# Patient Record
Sex: MALE | Race: WHITE | NOT HISPANIC OR LATINO | Employment: FULL TIME | URBAN - NONMETROPOLITAN AREA
[De-identification: names, ages, dates, MRNs, and addresses within clinical notes are randomized per-mention and may not be internally consistent; named-entity substitution may affect disease eponyms.]

---

## 2021-03-30 ENCOUNTER — OUTSIDE FACILITY SERVICE (OUTPATIENT)
Dept: CARDIOLOGY | Facility: CLINIC | Age: 31
End: 2021-03-30

## 2021-03-30 PROCEDURE — 93350 STRESS TTE ONLY: CPT | Performed by: INTERNAL MEDICINE

## 2021-03-30 PROCEDURE — 93018 CV STRESS TEST I&R ONLY: CPT | Performed by: INTERNAL MEDICINE

## 2021-10-19 ENCOUNTER — CONSULT (OUTPATIENT)
Dept: ONCOLOGY | Facility: CLINIC | Age: 31
End: 2021-10-19

## 2021-10-19 ENCOUNTER — LAB (OUTPATIENT)
Dept: LAB | Facility: HOSPITAL | Age: 31
End: 2021-10-19

## 2021-10-19 VITALS
RESPIRATION RATE: 16 BRPM | OXYGEN SATURATION: 99 % | HEIGHT: 67 IN | WEIGHT: 189.5 LBS | SYSTOLIC BLOOD PRESSURE: 138 MMHG | BODY MASS INDEX: 29.74 KG/M2 | DIASTOLIC BLOOD PRESSURE: 80 MMHG | TEMPERATURE: 97.8 F | HEART RATE: 89 BPM

## 2021-10-19 DIAGNOSIS — D72.829 LEUKOCYTOSIS, UNSPECIFIED TYPE: Primary | ICD-10-CM

## 2021-10-19 LAB
ALBUMIN SERPL-MCNC: 4.6 G/DL (ref 3.5–5.2)
ALBUMIN/GLOB SERPL: 1.7 G/DL
ALP SERPL-CCNC: 72 U/L (ref 39–117)
ALT SERPL W P-5'-P-CCNC: 27 U/L (ref 1–41)
ANION GAP SERPL CALCULATED.3IONS-SCNC: 6 MMOL/L (ref 5–15)
AST SERPL-CCNC: 20 U/L (ref 1–40)
BASOPHILS # BLD AUTO: 0.05 10*3/MM3 (ref 0–0.2)
BASOPHILS NFR BLD AUTO: 0.5 % (ref 0–1.5)
BILIRUB SERPL-MCNC: 0.2 MG/DL (ref 0–1.2)
BUN SERPL-MCNC: 16 MG/DL (ref 6–20)
BUN/CREAT SERPL: 22.2 (ref 7–25)
CALCIUM SPEC-SCNC: 9.7 MG/DL (ref 8.6–10.5)
CHLORIDE SERPL-SCNC: 107 MMOL/L (ref 98–107)
CO2 SERPL-SCNC: 29 MMOL/L (ref 22–29)
CREAT SERPL-MCNC: 0.72 MG/DL (ref 0.76–1.27)
CYTOLOGIST CVX/VAG CYTO: NORMAL
DEPRECATED RDW RBC AUTO: 39.9 FL (ref 37–54)
EOSINOPHIL # BLD AUTO: 0.5 10*3/MM3 (ref 0–0.4)
EOSINOPHIL NFR BLD AUTO: 5.3 % (ref 0.3–6.2)
ERYTHROCYTE [DISTWIDTH] IN BLOOD BY AUTOMATED COUNT: 12.2 % (ref 12.3–15.4)
FERRITIN SERPL-MCNC: 65.53 NG/ML (ref 30–400)
FOLATE SERPL-MCNC: 12.5 NG/ML (ref 4.78–24.2)
GFR SERPL CREATININE-BSD FRML MDRD: 126 ML/MIN/1.73
GLOBULIN UR ELPH-MCNC: 2.7 GM/DL
GLUCOSE SERPL-MCNC: 95 MG/DL (ref 65–99)
HCT VFR BLD AUTO: 43.2 % (ref 37.5–51)
HCV AB SER DONR QL: NORMAL
HGB BLD-MCNC: 14.2 G/DL (ref 13–17.7)
IMM GRANULOCYTES # BLD AUTO: 0.03 10*3/MM3 (ref 0–0.05)
IMM GRANULOCYTES NFR BLD AUTO: 0.3 % (ref 0–0.5)
IRON 24H UR-MRATE: 61 MCG/DL (ref 59–158)
IRON SATN MFR SERPL: 15 % (ref 20–50)
LDH SERPL-CCNC: 170 U/L (ref 135–225)
LYMPHOCYTES # BLD AUTO: 2.81 10*3/MM3 (ref 0.7–3.1)
LYMPHOCYTES NFR BLD AUTO: 29.8 % (ref 19.6–45.3)
MCH RBC QN AUTO: 29.6 PG (ref 26.6–33)
MCHC RBC AUTO-ENTMCNC: 32.9 G/DL (ref 31.5–35.7)
MCV RBC AUTO: 90 FL (ref 79–97)
MONOCYTES # BLD AUTO: 0.98 10*3/MM3 (ref 0.1–0.9)
MONOCYTES NFR BLD AUTO: 10.4 % (ref 5–12)
NEUTROPHILS NFR BLD AUTO: 5.07 10*3/MM3 (ref 1.7–7)
NEUTROPHILS NFR BLD AUTO: 53.7 % (ref 42.7–76)
NRBC BLD AUTO-RTO: 0 /100 WBC (ref 0–0.2)
PATH INTERP BLD-IMP: NORMAL
PLATELET # BLD AUTO: 256 10*3/MM3 (ref 140–450)
PMV BLD AUTO: 9.9 FL (ref 6–12)
POTASSIUM SERPL-SCNC: 4.3 MMOL/L (ref 3.5–5.2)
PROT SERPL-MCNC: 7.3 G/DL (ref 6–8.5)
RBC # BLD AUTO: 4.8 10*6/MM3 (ref 4.14–5.8)
SODIUM SERPL-SCNC: 142 MMOL/L (ref 136–145)
TIBC SERPL-MCNC: 402 MCG/DL (ref 298–536)
TRANSFERRIN SERPL-MCNC: 270 MG/DL (ref 200–360)
VIT B12 BLD-MCNC: 713 PG/ML (ref 211–946)
WBC # BLD AUTO: 9.44 10*3/MM3 (ref 3.4–10.8)

## 2021-10-19 PROCEDURE — 83615 LACTATE (LD) (LDH) ENZYME: CPT | Performed by: INTERNAL MEDICINE

## 2021-10-19 PROCEDURE — 80053 COMPREHEN METABOLIC PANEL: CPT | Performed by: INTERNAL MEDICINE

## 2021-10-19 PROCEDURE — 82607 VITAMIN B-12: CPT | Performed by: INTERNAL MEDICINE

## 2021-10-19 PROCEDURE — 85060 BLOOD SMEAR INTERPRETATION: CPT | Performed by: INTERNAL MEDICINE

## 2021-10-19 PROCEDURE — 86803 HEPATITIS C AB TEST: CPT | Performed by: INTERNAL MEDICINE

## 2021-10-19 PROCEDURE — 86038 ANTINUCLEAR ANTIBODIES: CPT | Performed by: INTERNAL MEDICINE

## 2021-10-19 PROCEDURE — 84466 ASSAY OF TRANSFERRIN: CPT | Performed by: INTERNAL MEDICINE

## 2021-10-19 PROCEDURE — 82746 ASSAY OF FOLIC ACID SERUM: CPT | Performed by: INTERNAL MEDICINE

## 2021-10-19 PROCEDURE — 36415 COLL VENOUS BLD VENIPUNCTURE: CPT | Performed by: INTERNAL MEDICINE

## 2021-10-19 PROCEDURE — 85025 COMPLETE CBC W/AUTO DIFF WBC: CPT | Performed by: INTERNAL MEDICINE

## 2021-10-19 PROCEDURE — 99204 OFFICE O/P NEW MOD 45 MIN: CPT | Performed by: INTERNAL MEDICINE

## 2021-10-19 PROCEDURE — 83540 ASSAY OF IRON: CPT | Performed by: INTERNAL MEDICINE

## 2021-10-19 PROCEDURE — 82728 ASSAY OF FERRITIN: CPT | Performed by: INTERNAL MEDICINE

## 2021-10-19 RX ORDER — ALBUTEROL SULFATE 90 UG/1
2 AEROSOL, METERED RESPIRATORY (INHALATION) EVERY 4 HOURS PRN
COMMUNITY

## 2021-10-19 RX ORDER — GABAPENTIN 800 MG/1
800 TABLET ORAL 3 TIMES DAILY
COMMUNITY

## 2021-10-19 NOTE — PROGRESS NOTES
[facility] HEMATOLOGY & ONCOLOGY  [address]  [place] [id number] [phone]       10/19/2021     CONSULT NOTE     PATIENT NAME: Benny Broussard  YOB: 1990  MR: [id number]    REFERRING PROVIDER: Karen Cabrera DO  PCP: Karen Cabrera DO    REASON FOR REFERRAL: Leukocytosis.    HPI:   Mr. Benny Broussard is a very pleasant 31 y.o. white male referred to us for evaluation of leukocytosis.  He tells me that for several years has had elevated WBC.  I told him that I do not have all the prior labs but WBC on 10/1/2021 was 15.8.  He denies that he had any evidence of infection.  Nonetheless, he has multisystem complaints.  He complains of severe fatigue, frequent chills, 30 pound unintentional weight loss in the last 2 months.  He also reports that was diagnosed previously with hepatomegaly but the etiology of that was never determined.    I discussed with him potential causes of leukocytosis and multisystem symptoms that he is describing.  We discussed that leukocytosis can be caused by smoking and can be a sign of reactive phenomena but in some cases it may be secondary to a myelo or lymphoproliferative disorder.  I told him that the multisystem complaints are of concern and I recommended to proceed with laboratory testing and CT scan chest/abdomen consider doing the 30 pound weight loss and severe fatigue.    PAST HISTORY:     HEME/ONC HISTORY  Oncology/Hematology History Overview Note   HEME/ONC DX/RX/INVESTIGATIONS SUMMARY:   DX: Leukocytosis undergoing evaluation.  RX:   OTHER INFO: Patient gives history of hepatomegaly of undetermined etiology.  Multisystem complains of fatigue, unintentional weight loss, chills.  INVESTIGATIONS:   3/19/2021, WBC 14.0 hemoglobin 14.9 MCV 89.6 platelets 278 neutrophils 8.0 lymphocytes 3.8 monocytes 1.6 BUN 13 creatinine 1.0,  LFTs normal.     10/1/2021, WBC 15.8  "hemoglobin 14.4 MCV 91.5 platelets 275 neutrophils 9.9 lymphocytes 4.2 monocytes 1.3.       MEDICAL HISTORY   has a past medical history of Acid reflux, ADD (attention deficit disorder), Asthma, Hyperlipidemia, Leukocytosis, Mixed bipolar affective disorder (HCC), and Neuropathy.   HEALTH MAINTENANCE ITEMS  Health Maintenance Due   Topic Date Due   • ANNUAL PHYSICAL  Never done   • Pneumococcal Vaccine 0-64 (1 of 2 - PPSV23) Never done   • INFLUENZA VACCINE  08/01/2021   • LIPID PANEL  Never done     <no information>  Last Completed Colonoscopy     This patient has no relevant Health Maintenance data.        Immunization History   Administered Date(s) Administered   • COVID-19 (MODERNA) 04/22/2021, 06/03/2021     Last Completed Mammogram     This patient has no relevant Health Maintenance data.        FAMILY HISTORY  Family History   Problem Relation Age of Onset   • Diabetes Mother    • Heart failure Mother    • Hypertension Father        Cancer-related family history is not on file.   SURGICAL HISTORY   has no past surgical history on file.  SOCIAL HISTORY  Social History     Socioeconomic History   • Marital status:    Tobacco Use   • Smoking status: Current Every Day Smoker     MEDICATIONS:     Current Outpatient Medications   Medication Instructions   • albuterol sulfate  (90 Base) MCG/ACT inhaler 2 puffs, Inhalation, Every 4 Hours PRN   • gabapentin (NEURONTIN) 800 mg, Oral, 3 Times Daily   • lisdexamfetamine (VYVANSE) 70 mg, Oral, Every Morning         No current facility-administered medications for this visit.     ALLERGIES:     Allergies   Allergen Reactions   • Shellfish-Derived Products Anaphylaxis and Itching   • Demerol [Meperidine] Other (See Comments)     Made angry     ROS:   Pertinent positive and negative findings as noted in HPI.   PHYSICAL EXAM:   /80   Pulse 89   Temp 97.8 °F (36.6 °C)   Resp 16   Ht 170.2 cm (67\")   Wt 86 kg (189 lb 8 oz)   SpO2 99%   BMI 29.68 kg/m² "  Body surface area is 1.98 meters squared.   Pain Score    10/19/21 1000   PainSc: 0-No pain     Alert, oriented x3, cooperative, not in distress.  HEENT: Normocephalic, wearing a facemask.  Neck: No lymphadenopathy.  Lungs: No tachypnea. Clear bilaterally.  Heart: Regular rate and rhythm.  Abdomen: Soft, nontender, mild hepatomegaly, 3 to 4 cm below the right chondral margin.  Extremities: No ankle edema.  Neurologic: Moves all extremities.    INVESTIGATIONS/LABS:   See summary above    ASSESSMENT:   Leukocytosis associated with multisystem complaints including severe fatigue, 30 pound unintentional weight loss in 2 months, chills.  Differential diagnosis include myeloproliferative and lymphoproliferative disorders, reactive leukocytosis associated with smoking.    PLAN/RECOMMENDATIONS:   Labs today.    CT chest/abd.    VST in 2 weeks.    DX/ORDERS:   Diagnoses and all orders for this visit:    1. Leukocytosis, unspecified type (Primary)  -     ARNOLD With / DsDNA, RNP, Sjogrens A / B, Tao  -     CBC Auto Differential  -     Comprehensive Metabolic Panel  -     Ferritin  -     Iron Profile  -     Lactate Dehydrogenase  -     Vitamin B12 & Folate  -     Hepatitis C Antibody  -     Peripheral Blood Smear  -     Leukemia / Lymphoma Eval  -     CT Chest Without Contrast Diagnostic; Future  -     CT Abdomen Pelvis Without Contrast; Future        Future Appointments   Date Time Provider Department Center   11/2/2021  9:30 AM Stewart Pérez MD [facility]        Thank you very much for having referred this very pleasant patient.    Stewart Pérez MD  10/19/2021    cc: Karen Cabrera*, Karen Cabrera DO

## 2021-10-20 LAB — ANA SER QL: NEGATIVE

## 2021-10-22 ENCOUNTER — PATIENT ROUNDING (BHMG ONLY) (OUTPATIENT)
Dept: ONCOLOGY | Facility: CLINIC | Age: 31
End: 2021-10-22

## 2021-10-22 LAB — LEUK/LYMPH PHENO: NORMAL

## 2021-10-22 NOTE — PROGRESS NOTES
October 22, 2021    Hello, may I speak with Benny Broussard?    My name is Ayla Winters.     I am  with [facility] HEMATOLOGY & ONCOLOGY  [address]  [place] [id number]  [phone].    Before we get started may I verify your date of birth? 1990    I am calling to officially welcome you to our practice and ask about your recent visit. Is this a good time to talk? No, left voicemail